# Patient Record
Sex: FEMALE | Race: BLACK OR AFRICAN AMERICAN | NOT HISPANIC OR LATINO | URBAN - METROPOLITAN AREA
[De-identification: names, ages, dates, MRNs, and addresses within clinical notes are randomized per-mention and may not be internally consistent; named-entity substitution may affect disease eponyms.]

---

## 2018-08-15 ENCOUNTER — EMERGENCY (EMERGENCY)
Facility: HOSPITAL | Age: 28
LOS: 1 days | Discharge: HOME | End: 2018-08-15
Attending: EMERGENCY MEDICINE | Admitting: EMERGENCY MEDICINE
Payer: COMMERCIAL

## 2018-08-15 VITALS
DIASTOLIC BLOOD PRESSURE: 74 MMHG | HEART RATE: 66 BPM | TEMPERATURE: 98 F | SYSTOLIC BLOOD PRESSURE: 105 MMHG | OXYGEN SATURATION: 99 % | RESPIRATION RATE: 18 BRPM

## 2018-08-15 VITALS
DIASTOLIC BLOOD PRESSURE: 67 MMHG | SYSTOLIC BLOOD PRESSURE: 105 MMHG | WEIGHT: 139.99 LBS | OXYGEN SATURATION: 99 % | TEMPERATURE: 98 F | HEART RATE: 74 BPM | RESPIRATION RATE: 18 BRPM

## 2018-08-15 PROCEDURE — 99283 EMERGENCY DEPT VISIT LOW MDM: CPT

## 2018-08-15 PROCEDURE — 99284 EMERGENCY DEPT VISIT MOD MDM: CPT

## 2018-08-15 NOTE — ED PROVIDER NOTE - OBJECTIVE STATEMENT
26 yo female with h/o anxiety, h/o left breast cyst, in the ER after an episode of left sided chest pain. Pt appears very tearful and emotional, reports she felt pain as sharp, worse with left arm movement. Pt also mentioned that she left work because of that pain and was very upset, c/o choking like sensation while walking down the street and decided to come to ER for evaluation. Pt denies any prior cardiac h/o, denies any leg pain or swelling, denies cough, SOB, denies any strenuous physical activity.

## 2018-08-15 NOTE — ED ADULT NURSE NOTE - NSIMPLEMENTINTERV_GEN_ALL_ED
Implemented All Universal Safety Interventions:  [employer] to call system. Call bell, personal items and telephone within reach. Instruct patient to call for assistance. Room bathroom lighting operational. Non-slip footwear when patient is off stretcher. Physically safe environment: no spills, clutter or unnecessary equipment. Stretcher in lowest position, wheels locked, appropriate side rails in place.

## 2018-08-15 NOTE — ED ADULT NURSE NOTE - OBJECTIVE STATEMENT
Patient c/o of left sided chest pain sharp 4/10, started this afternoon, states has lump on left breast X 6 yrs, told was a benign cyst , pain or the same area.  States had SOB and anxiiety, no nausea/vomitting.

## 2018-08-15 NOTE — ED ADULT NURSE NOTE - CHPI ED NUR SYMPTOMS NEG
no syncope/no fever/no vomiting/no congestion/no diaphoresis/no dizziness/no back pain/no chills/no nausea

## 2018-08-15 NOTE — ED PROVIDER NOTE - MEDICAL DECISION MAKING DETAILS
26 yo female with h/o anxiety, in the ER after an episode of left side CP, choking like sensation. Pt became afraid and concerned about her heart and her breast? because she has h/o benign cyst in her left breast. While in the ER - asymptomatic, VSS, ECG- NSR, non-ischemic, lungs-CTA b/l. clinical presentation highly suspicious for anxiety attack. Pt agrees . stable for discharge , returned precautions discussed.

## 2018-08-15 NOTE — ED ADULT TRIAGE NOTE - CHIEF COMPLAINT QUOTE
intermittent, non-radiating left-sided chest discomfort that started today.  "I have a lump on my left breast since 2012."  Denies other symptoms

## 2018-08-15 NOTE — ED PROVIDER NOTE - NS ED MD DISPO DISCHARGE
Patient of Dr. Shafer'Home

## 2018-08-15 NOTE — ED PROVIDER NOTE - ATTENDING CONTRIBUTION TO CARE
atypical CP , now resolved, reportedly feeling anxious , EKG wnl, xray wnl, no PE or ACS risk factors, Exam entirely normal , no edema, no calf tenderness, S1S2 no m/r/g cbta, appears well. high suspician for CP associated with anxiety, consider musculoskeletal, stable for d/c discussed emergent return instructions

## 2018-08-15 NOTE — ED PROVIDER NOTE - PHYSICAL EXAMINATION
breast exam: symmetrical, no nipple discharge, no discolorations, no lymphadenopathy, palpable, movable, small cysts? b/l, non-tender,

## 2018-08-19 DIAGNOSIS — F41.9 ANXIETY DISORDER, UNSPECIFIED: ICD-10-CM

## 2018-08-19 DIAGNOSIS — R07.89 OTHER CHEST PAIN: ICD-10-CM
